# Patient Record
Sex: MALE | Race: WHITE | URBAN - METROPOLITAN AREA
[De-identification: names, ages, dates, MRNs, and addresses within clinical notes are randomized per-mention and may not be internally consistent; named-entity substitution may affect disease eponyms.]

---

## 2018-01-11 ENCOUNTER — DOCTOR'S OFFICE (OUTPATIENT)
Dept: URBAN - METROPOLITAN AREA CLINIC 137 | Facility: CLINIC | Age: 73
Setting detail: OPHTHALMOLOGY
End: 2018-01-11
Payer: COMMERCIAL

## 2018-01-11 DIAGNOSIS — H52.4: ICD-10-CM

## 2018-01-11 PROBLEM — H25.13 CATARACT NUCLEAR SCLEROSIS; BOTH EYES: Status: ACTIVE | Noted: 2018-01-11

## 2018-01-11 PROCEDURE — 92004 COMPRE OPH EXAM NEW PT 1/>: CPT | Performed by: OPHTHALMOLOGY

## 2018-01-11 ASSESSMENT — REFRACTION_OUTSIDERX
OD_AXIS: 105
OS_VA3: 20/
OD_VA1: 20/30
OS_AXIS: 005
OD_CYLINDER: +0.25
OS_CYLINDER: +0.75
OS_VA2: 20/20
OS_SPHERE: -3.50
OD_SPHERE: -4.00
OD_VA2: 20/20
OD_VA3: 20/
OU_VA: 20/
OS_ADD: +2.50
OD_ADD: +2.50
OS_VA1: 20/20

## 2018-01-11 ASSESSMENT — REFRACTION_CURRENTRX
OD_CYLINDER: +0.75
OS_OVR_VA: 20/
OS_SPHERE: -3.25
OS_OVR_VA: 20/
OD_ADD: +2.50
OS_AXIS: 005
OD_OVR_VA: 20/
OD_AXIS: 105
OS_ADD: +2.50
OD_VPRISM_DIRECTION: PROGS
OD_OVR_VA: 20/
OS_CYLINDER: +0.50
OS_OVR_VA: 20/
OD_OVR_VA: 20/
OD_SPHERE: -4.00

## 2018-01-11 ASSESSMENT — REFRACTION_MANIFEST
OD_VA1: 20/
OD_VA1: 20/
OU_VA: 20/
OS_VA1: 20/
OD_VA3: 20/
OD_VA2: 20/
OS_VA1: 20/
OS_VA3: 20/
OD_VA2: 20/
OS_VA2: 20/
OS_VA2: 20/
OD_VA3: 20/
OS_VA3: 20/
OU_VA: 20/

## 2018-01-11 ASSESSMENT — REFRACTION_AUTOREFRACTION
OS_CYLINDER: +1.25
OD_CYLINDER: +1.25
OD_AXIS: 169
OD_SPHERE: -4.00
OS_SPHERE: -3.50
OS_AXIS: 11

## 2018-01-11 ASSESSMENT — CONFRONTATIONAL VISUAL FIELD TEST (CVF)
OS_FINDINGS: FULL
OD_FINDINGS: FULL

## 2018-01-11 ASSESSMENT — VISUAL ACUITY
OD_BCVA: 20/20-2
OS_BCVA: 20/25-1

## 2018-01-11 ASSESSMENT — SPHEQUIV_DERIVED
OD_SPHEQUIV: -3.375
OS_SPHEQUIV: -2.875

## 2021-01-20 DIAGNOSIS — Z23 ENCOUNTER FOR IMMUNIZATION: ICD-10-CM

## 2021-01-21 ENCOUNTER — IMMUNIZATIONS (OUTPATIENT)
Dept: FAMILY MEDICINE CLINIC | Facility: HOSPITAL | Age: 76
End: 2021-01-21

## 2021-01-21 DIAGNOSIS — Z23 ENCOUNTER FOR IMMUNIZATION: Primary | ICD-10-CM

## 2021-01-21 PROCEDURE — 0011A SARS-COV-2 / COVID-19 MRNA VACCINE (MODERNA) 100 MCG: CPT | Performed by: INTERNAL MEDICINE

## 2021-01-21 PROCEDURE — 91301 SARS-COV-2 / COVID-19 MRNA VACCINE (MODERNA) 100 MCG: CPT | Performed by: INTERNAL MEDICINE

## 2021-02-17 ENCOUNTER — IMMUNIZATIONS (OUTPATIENT)
Dept: FAMILY MEDICINE CLINIC | Facility: HOSPITAL | Age: 76
End: 2021-02-17

## 2021-02-17 DIAGNOSIS — Z23 ENCOUNTER FOR IMMUNIZATION: Primary | ICD-10-CM

## 2021-02-17 PROCEDURE — 0012A SARS-COV-2 / COVID-19 MRNA VACCINE (MODERNA) 100 MCG: CPT | Performed by: EMERGENCY MEDICINE

## 2021-02-17 PROCEDURE — 91301 SARS-COV-2 / COVID-19 MRNA VACCINE (MODERNA) 100 MCG: CPT | Performed by: EMERGENCY MEDICINE

## 2021-05-06 ENCOUNTER — APPOINTMENT (OUTPATIENT)
Dept: RADIOLOGY | Facility: AMBULARY SURGERY CENTER | Age: 76
End: 2021-05-06
Attending: ORTHOPAEDIC SURGERY
Payer: COMMERCIAL

## 2021-05-06 DIAGNOSIS — M79.671 PAIN IN RIGHT FOOT: ICD-10-CM

## 2021-05-06 PROCEDURE — 73630 X-RAY EXAM OF FOOT: CPT

## 2021-05-13 ENCOUNTER — HOSPITAL ENCOUNTER (OUTPATIENT)
Dept: MRI IMAGING | Facility: HOSPITAL | Age: 76
Discharge: HOME | End: 2021-05-13
Attending: ORTHOPAEDIC SURGERY
Payer: COMMERCIAL

## 2021-05-13 DIAGNOSIS — M89.9 LYTIC LESION OF BONE ON X-RAY: ICD-10-CM

## 2021-05-13 DIAGNOSIS — M79.671 PAIN IN RIGHT FOOT: ICD-10-CM

## 2021-05-13 PROCEDURE — 73718 MRI LOWER EXTREMITY W/O DYE: CPT

## 2021-05-13 PROCEDURE — G1004 CDSM NDSC: HCPCS

## 2021-05-20 VITALS
HEIGHT: 69 IN | WEIGHT: 192.9 LBS | DIASTOLIC BLOOD PRESSURE: 83 MMHG | SYSTOLIC BLOOD PRESSURE: 152 MMHG | HEART RATE: 69 BPM | BODY MASS INDEX: 28.57 KG/M2

## 2021-05-20 DIAGNOSIS — G57.61 MORTON'S NEUROMA OF THIRD INTERSPACE OF RIGHT FOOT: Primary | ICD-10-CM

## 2021-05-20 DIAGNOSIS — M77.51 BURSITIS OF INTERMETATARSAL BURSA OF RIGHT FOOT: ICD-10-CM

## 2021-05-20 PROCEDURE — 99213 OFFICE O/P EST LOW 20 MIN: CPT | Performed by: ORTHOPAEDIC SURGERY

## 2021-05-20 PROCEDURE — 1160F RVW MEDS BY RX/DR IN RCRD: CPT | Performed by: ORTHOPAEDIC SURGERY

## 2021-05-20 NOTE — PROGRESS NOTES
Assessment:       1  Eng's neuroma of third interspace of right foot    2  Bursitis of intermetatarsal bursa of right foot          Plan:          I explained my current clinical findings and reviewed right foot MRI findings with Mr Minh Mejia  His right foot discomfort is likely secondary to 3rd intermetatarsal space bursitis and associated small Eng's neuroma  He is already having some symptomatic improvement with wearing a metatarsal pad  I explained to him the possibility of intermetatarsal bursa and Eng's neuroma cortisone injection  He would currently like to continue with a more conservative approach with wearing a metatarsal pad  He will follow up in about 2 months time for reassessment in this regard and if symptoms significantly persist we will consider doing intermetatarsal cortisone injection  Subjective:     Patient ID: Roosevelt Colunga is a 68 y o  male  Chief Complaint: Right  Foot pain follow-up    HPI  Mr Minh Mejia  Is a 51-year-old gentleman who presents today for a follow-up of right foot pain  Was last seen on 5/6/2021For right foot pain present for nearly 3 months duration  Plain radiograph had also revealed a lytic lesion of the 5th metatarsal head for which an MRI was done for further assessment  Right foot MRI revealed a small 3rd intermetatarsal space Eng's neuroma along with a small 3rd intermetatarsal space bursitis  The 5th metatarsal head lytic lesion was thought to be a chronic bony erosion possibly a sequelae of previous gouty arthritis without any evidence of acute inflammation  Today, the patient reports that his right foot pain is improving after wearing metatarsal pads in his footwear  Denies any significant tingling or numbness of the right foot toes  Denies any new injury       Social History     Occupational History    Not on file   Tobacco Use    Smoking status: Never Smoker    Smokeless tobacco: Never Used   Substance and Sexual Activity  Alcohol use: Never     Frequency: Never    Drug use: Never    Sexual activity: Not on file      Review of Systems   Constitutional: Negative  HENT: Negative  Eyes: Negative  Respiratory: Negative  Cardiovascular: Negative  Gastrointestinal: Negative  Endocrine: Negative  Genitourinary: Negative  Skin: Negative  Allergic/Immunologic: Negative  Neurological: Negative  Psychiatric/Behavioral: Negative  Objective:     Ortho ExamPhysical Exam  Vitals signs and nursing note reviewed  Constitutional:       Appearance: He is well-developed  HENT:      Head: Normocephalic and atraumatic  Eyes:      Conjunctiva/sclera: Conjunctivae normal    Cardiovascular:      Rate and Rhythm: Normal rate and regular rhythm  Pulmonary:      Effort: Pulmonary effort is normal  No respiratory distress  Skin:     General: Skin is warm  Findings: No erythema  Neurological:      Mental Status: He is alert and oriented to person, place, and time  Psychiatric:         Behavior: Behavior normal          Thought Content: Thought content normal          Judgment: Judgment normal            Right foot exam:   pes planus is noted  There is some mild tenderness to palpation in the 3rd intermetatarsal space but this is comparatively less than the last clinical exam done 2 weeks ago  There is only minimal discomfort with metatarsal squeeze  There is no midfoot or hindfoot tenderness noted  Normal sensation to light touch in all digits of the right foot  I have personally reviewed pertinent films in PACS and my interpretation is As noted in the HPI

## 2021-06-16 ENCOUNTER — OFFICE VISIT (OUTPATIENT)
Dept: PODIATRY | Facility: CLINIC | Age: 76
End: 2021-06-16
Payer: COMMERCIAL

## 2021-06-16 VITALS
BODY MASS INDEX: 28.58 KG/M2 | DIASTOLIC BLOOD PRESSURE: 79 MMHG | HEART RATE: 78 BPM | SYSTOLIC BLOOD PRESSURE: 159 MMHG | HEIGHT: 69 IN | WEIGHT: 193 LBS

## 2021-06-16 DIAGNOSIS — G57.61 LESION OF RIGHT PLANTAR NERVE: Primary | ICD-10-CM

## 2021-06-16 PROCEDURE — 1036F TOBACCO NON-USER: CPT | Performed by: PODIATRIST

## 2021-06-16 PROCEDURE — 1160F RVW MEDS BY RX/DR IN RCRD: CPT | Performed by: PODIATRIST

## 2021-06-16 PROCEDURE — 99202 OFFICE O/P NEW SF 15 MIN: CPT | Performed by: PODIATRIST

## 2021-06-16 NOTE — PROGRESS NOTES
Assessment/Plan:      I personally reviewed x-rays dated 05/06/2021  They were negative 1st significant osseous pathology  I personally reviewed MRI report dated 05/13/2021  They were positive for a small Eng's neuroma  Reviewed treatment options for Eng's neuroma with patient  Explained to patient that he has mild symptomatology at this time with numbness the main issue  This will likely persist   Cortisone injection is not needed at this time due to paucity of symptoms  Patient advised to wear wide shoes and insoles as much as possible  He was advised to contact me should pain recur or persist     No problem-specific Assessment & Plan notes found for this encounter  Diagnoses and all orders for this visit:    Lesion of right plantar nerve          Subjective:      Patient ID: Roosevelt Colunga is a 68 y o  male  HPI       Patient, a 68-year-old male in good health presents with concern regarding his right foot  For the past few months, patient has been having pain from a diagnosed Eng's neuroma  He has seen Dr Leopold Care  For this disorder and has had an x-ray and an MRI  The x-rays were read as negative for significant pathology but the MRI was positive for Eng's neuroma at the 3rd metatarsal interspace  Patient notes that he has minimal if any pain at this time although he does have numbness in his 3rd and 4th toes  He has purchased wide running shoes and is wearing an inner sole in the shoe  The following portions of the patient's history were reviewed and updated as appropriate: allergies, current medications, past family history, past medical history, past social history, past surgical history and problem list     Review of Systems   Cardiovascular: Negative  Gastrointestinal: Negative  Musculoskeletal: Negative  Neurological: Negative                Objective:      /79   Pulse 78   Ht 5' 8 5" (1 74 m)   Wt 87 5 kg (193 lb)   BMI 28 92 kg/m² Physical Exam  Constitutional:       Appearance: Normal appearance  Cardiovascular:      Pulses: Normal pulses  Musculoskeletal:         General: Deformity present  Comments: Pes planus bilateral   Skin:     General: Skin is warm  Neurological:      General: No focal deficit present  Mental Status: He is oriented to person, place, and time  Comments: Dejuan Varela sign negative 3rd metatarsal interspace right foot

## 2023-03-15 ENCOUNTER — HOSPITAL ENCOUNTER (OUTPATIENT)
Dept: NON INVASIVE DIAGNOSTICS | Facility: HOSPITAL | Age: 78
Discharge: HOME | End: 2023-03-15
Attending: INTERNAL MEDICINE

## 2023-03-15 VITALS
WEIGHT: 190 LBS | HEIGHT: 68 IN | HEART RATE: 78 BPM | BODY MASS INDEX: 28.79 KG/M2 | SYSTOLIC BLOOD PRESSURE: 159 MMHG | DIASTOLIC BLOOD PRESSURE: 79 MMHG

## 2023-03-15 DIAGNOSIS — I35.9 AORTIC VALVE DISEASE: ICD-10-CM

## 2023-03-15 LAB
AORTIC ROOT: 2.9 CM
AORTIC VALVE MEAN VELOCITY: 13.2 M/S
APICAL FOUR CHAMBER EJECTION FRACTION: 59 %
ASCENDING AORTA: 4 CM
AV AREA BY CONTINUOUS VTI: 2.1 CM2
AV AREA PEAK VELOCITY: 2.3 CM2
AV LVOT MEAN GRADIENT: 2 MMHG
AV LVOT PEAK GRADIENT: 4 MMHG
AV MEAN GRADIENT: 8 MMHG
AV PEAK GRADIENT: 14 MMHG
AV VALVE AREA: 2.12 CM2
AV VELOCITY RATIO: 0.5
DOP CALC AO PEAK VEL: 1.89 M/S
DOP CALC AO VTI: 42.39 CM
DOP CALC LVOT AREA: 4.52 CM2
DOP CALC LVOT DIAMETER: 2.4 CM
DOP CALC LVOT PEAK VEL VTI: 19.88 CM
DOP CALC LVOT PEAK VEL: 0.95 M/S
DOP CALC LVOT STROKE INDEX: 43.8 ML/M2
DOP CALC LVOT STROKE VOLUME: 89.89 CM3
E WAVE DECELERATION TIME: 256 MS
FRACTIONAL SHORTENING: 36 % (ref 28–44)
INTERVENTRICULAR SEPTUM IN DIASTOLE (PARASTERNAL SHORT AXIS VIEW): 1.3 CM
INTERVENTRICULAR SEPTUM: 1.3 CM (ref 0.6–1.1)
LAAS-AP2: 20.7 CM2
LAAS-AP4: 22.5 CM2
LEFT ATRIUM SIZE: 3.7 CM
LEFT INTERNAL DIMENSION IN SYSTOLE: 2.7 CM (ref 2.1–4)
LEFT VENTRICULAR INTERNAL DIMENSION IN DIASTOLE: 4.2 CM (ref 3.5–6)
LEFT VENTRICULAR POSTERIOR WALL IN END DIASTOLE: 1.2 CM
LEFT VENTRICULAR STROKE VOLUME: 51 ML
LVSV (TEICH): 51 ML
MV E'TISSUE VEL-SEP: 9 CM/S
MV PEAK A VEL: 0.68 M/S
MV PEAK E VEL: 64 CM/S
MV STENOSIS PRESSURE HALF TIME: 74 MS
MV VALVE AREA P 1/2 METHOD: 2.97 CM2
RA PRESSURE ESTIMATED: 4 MMHG
RIGHT ATRIAL 2D VOLUME: 58 ML
RIGHT ATRIUM AREA SYSTOLE A4C: 21.7 CM2
RIGHT VENTRICLE ID DIMENSION: 4.4 CM
RV PSP: 25 MMHG
SINOTUBULAR JUNCTION: 2.9 CM
SL CV LEFT ATRIUM LENGTH A2C: 5.8 CM
SL CV LV EF: 60
SL CV PED ECHO LEFT VENTRICLE DIASTOLIC VOLUME (MOD BIPLANE) 2D: 78 ML
SL CV PED ECHO LEFT VENTRICLE SYSTOLIC VOLUME (MOD BIPLANE) 2D: 27 ML
STJ: 2.9 CM
TR MAX PG: 21 MMHG
TR PEAK VELOCITY: 2.3 M/S
TRICUSPID ANNULAR PLANE SYSTOLIC EXCURSION: 2.2 CM
TRICUSPID VALVE PEAK REGURGITATION VELOCITY: 2.31 M/S

## 2023-08-24 ENCOUNTER — HOSPITAL ENCOUNTER (OUTPATIENT)
Dept: CT IMAGING | Facility: HOSPITAL | Age: 78
End: 2023-08-24
Attending: INTERNAL MEDICINE
Payer: COMMERCIAL

## 2023-08-24 DIAGNOSIS — I77.810 THORACIC AORTIC ECTASIA (HCC): ICD-10-CM

## 2023-08-24 PROCEDURE — 71250 CT THORAX DX C-: CPT

## 2023-11-01 ENCOUNTER — HOSPITAL ENCOUNTER (OUTPATIENT)
Dept: ULTRASOUND IMAGING | Facility: HOSPITAL | Age: 78
Discharge: HOME | End: 2023-11-01
Attending: UROLOGY
Payer: COMMERCIAL

## 2023-11-01 DIAGNOSIS — N21.1 CALCULUS IN URETHRA: ICD-10-CM

## 2023-11-01 DIAGNOSIS — N28.1 SIMPLE RENAL CYST: ICD-10-CM

## 2023-11-01 PROCEDURE — 76775 US EXAM ABDO BACK WALL LIM: CPT

## 2023-11-02 ENCOUNTER — TELEPHONE (OUTPATIENT)
Dept: GASTROENTEROLOGY | Facility: CLINIC | Age: 78
End: 2023-11-02

## 2023-11-02 ENCOUNTER — OFFICE VISIT (OUTPATIENT)
Dept: GASTROENTEROLOGY | Facility: CLINIC | Age: 78
End: 2023-11-02
Payer: COMMERCIAL

## 2023-11-02 VITALS
BODY MASS INDEX: 30.16 KG/M2 | WEIGHT: 199 LBS | SYSTOLIC BLOOD PRESSURE: 139 MMHG | DIASTOLIC BLOOD PRESSURE: 82 MMHG | HEIGHT: 68 IN

## 2023-11-02 DIAGNOSIS — C85.20: ICD-10-CM

## 2023-11-02 DIAGNOSIS — K21.9 GASTROESOPHAGEAL REFLUX DISEASE, UNSPECIFIED WHETHER ESOPHAGITIS PRESENT: Primary | ICD-10-CM

## 2023-11-02 DIAGNOSIS — Z12.11 COLON CANCER SCREENING: ICD-10-CM

## 2023-11-02 PROCEDURE — 99203 OFFICE O/P NEW LOW 30 MIN: CPT | Performed by: NURSE PRACTITIONER

## 2023-11-02 RX ORDER — PANTOPRAZOLE SODIUM 40 MG/1
40 TABLET, DELAYED RELEASE ORAL DAILY
Qty: 90 TABLET | Refills: 1 | Status: SHIPPED | OUTPATIENT
Start: 2023-11-02 | End: 2024-01-31

## 2023-11-02 NOTE — TELEPHONE ENCOUNTER
Scheduled date of EGD/colonoscopy (as of today):11/16/23  Physician performing EGD/colonoscopy:Dr Jessica Gracia   Location of EGD/colonoscopy:[facility]  Desired bowel prep reviewed with patient:miralax   Instructions reviewed with patient by:sb  Clearances:  Dr Nicole Acosta -procedure clearance

## 2023-11-02 NOTE — PROGRESS NOTES
[facility] - Outpatient Consultation  Marguerite Davis 66 y.o. 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 Gastroesophageal reflux disease, unspecified whether esophagitis present  Patient reports a chronic cough on a daily basis. Patient also reports acid reflux. Patient was previously on pantoprazole by his PCP which did help to improve his symptoms but not resolve his symptoms. Patient denies nausea, vomiting, heartburn, epigastric or abdominal pain. CT chest done 8/24 showed nonspecific mildly enlarged lymph nodes adjacent to the distal esophagus more pronounced compared to April 2023 CT of abdomen and pelvis. Partially imaged diverticulum in the Horizon portion of the duodenum.    -Ambulatory Referral to Gastroenterology  - pantoprazole (PROTONIX) 40 mg tablet; Take 1 tablet (40 mg total) by mouth daily Take half hour prior to breakfast  Dispense: 90 tablet; Refill: 1  - EGD; schedule for EGD. Prep and procedure explained to patient in detail. Further recommendations pending results of EGD. 3. Colon cancer screening  Patient is age 66 patient has not had any colon cancer recently remaining for a long period of time. Last colon cancer screening done in [place] report is not available. - Colonoscopy; schedule for colonoscopy. Prep and procedure explained to patient in detail. Further recommendations pending results of colonoscopy. Follow up 8 weeks after procedure    ______________________________________________________________________    HPI: Maritza Finn is a 40-year-old male with past medical history of mediastinal large B-cell lymphoma, hyperlipidemia, and heart murmur who presents to office as consult. Patient reports a chronic cough on a daily basis. Patient also reports acid reflux.   Patient was previously on pantoprazole by his PCP which did help to improve his symptoms but not resolve his symptoms. Patient denies nausea, vomiting, heartburn, epigastric or abdominal pain. Patient denies blood in stool, blood from rectal area, or black tarry stool. Bowel patterns are regular. Abdomen exam benign, no abdominal tenderness or guarding. Patient does not smoke. CT chest done 8/24 showed nonspecific mildly enlarged lymph nodes adjacent to the distal esophagus more pronounced compared to April 2023 CT of abdomen and pelvis. Partially imaged diverticulum in the Horizon portion of the duodenum. No family history of gastric or colon cancer. Last colonoscopy done many years ago in [place] report is not available. REVIEW OF SYSTEMS:    CONSTITUTIONAL: Denies any fever, chills, rigors, and weight loss. HEENT: No earache or tinnitus. Denies hearing loss or visual disturbances. CARDIOVASCULAR: No chest pain or palpitations. RESPIRATORY: Denies any cough, hemoptysis, shortness of breath or dyspnea on exertion. GASTROINTESTINAL: As noted in the History of Present Illness. GENITOURINARY: No problems with urination. Denies any hematuria or dysuria. NEUROLOGIC: No dizziness or vertigo, denies headaches. MUSCULOSKELETAL: Denies any muscle or joint pain. SKIN: Denies skin rashes or itching. ENDOCRINE: Denies excessive thirst. Denies intolerance to heat or cold. PSYCHOSOCIAL: Denies depression or anxiety. Denies any recent memory loss. Historical Information   Past Medical History:   Diagnosis Date    Ear problems     Fallen arches 5/1/21    Hyperlipidemia      Past Surgical History:   Procedure Laterality Date    TONSILLECTOMY       Social History   Social History     Substance and Sexual Activity   Alcohol Use Not Currently    Alcohol/week: 0.0 standard drinks of alcohol     Social History     Substance and Sexual Activity   Drug Use Never     Social History     Tobacco Use   Smoking Status Never   Smokeless Tobacco Never     History reviewed.  No pertinent family history. Meds/Allergies       Current Outpatient Medications:     atorvastatin (LIPITOR) 20 mg tablet    metoprolol succinate (TOPROL-XL) 6.25 mg 24 hr tablet    pantoprazole (PROTONIX) 40 mg tablet    No Known Allergies        Objective     Blood pressure 139/82, height 5' 8" (1.727 m), weight 90.3 kg (199 lb). Body mass index is 30.26 kg/m². PHYSICAL EXAM:      General Appearance:   Alert, cooperative, no distress   HEENT:   Normocephalic, atraumatic, anicteric. Neck:  Supple, symmetrical, trachea midline   Lungs:   Clear to auscultation bilaterally; no rales, rhonchi or wheezing; respirations unlabored    Heart[de-identified]   Regular rate and rhythm; no murmur, rub, or gallop. Abdomen:   Soft, non-tender, non-distended; normal bowel sounds; no masses, no organomegaly    Genitalia:   Deferred    Rectal:   Deferred    Extremities:  No cyanosis, clubbing or edema    Pulses:  2+ and symmetric    Skin:  No jaundice, rashes, or lesions    Lymph nodes:  No palpable cervical lymphadenopathy        Lab Results:   No visits with results within 1 Day(s) from this visit.    Latest known visit with results is:   Appointment on 04/10/2023   Component Date Value    WBC 04/10/2023 9.85     RBC 04/10/2023 5.37     Hemoglobin 04/10/2023 15.9     Hematocrit 04/10/2023 47.7     MCV 04/10/2023 89     MCH 04/10/2023 29.6     MCHC 04/10/2023 33.3     RDW 04/10/2023 13.5     MPV 04/10/2023 9.5     Platelets 77/02/0695 222     nRBC 04/10/2023 0     Neutrophils Relative 04/10/2023 66     Immat GRANS % 04/10/2023 1     Lymphocytes Relative 04/10/2023 21     Monocytes Relative 04/10/2023 8     Eosinophils Relative 04/10/2023 3     Basophils Relative 04/10/2023 1     Neutrophils Absolute 04/10/2023 6.62     Immature Grans Absolute 04/10/2023 0.05     Lymphocytes Absolute 04/10/2023 2.10     Monocytes Absolute 04/10/2023 0.77     Eosinophils Absolute 04/10/2023 0.26     Basophils Absolute 04/10/2023 0.05     Sodium 04/10/2023 137 Potassium 04/10/2023 4.0     Chloride 04/10/2023 100     CO2 04/10/2023 28     ANION GAP 04/10/2023 9     BUN 04/10/2023 20     Creatinine 04/10/2023 1.07     Glucose 04/10/2023 85     Calcium 04/10/2023 9.7     AST 04/10/2023 16     ALT 04/10/2023 23     Alkaline Phosphatase 04/10/2023 116 (H)     Total Protein 04/10/2023 7.5     Albumin 04/10/2023 4.3     Total Bilirubin 04/10/2023 0.49     eGFR 04/10/2023 66     Urine Culture 04/10/2023 No Growth <1000 cfu/mL          Radiology Results:   No results found.

## 2023-11-02 NOTE — TELEPHONE ENCOUNTER
Can this pt go to [address] [place] [address]? Cardiac clearance sent to Dr Farhana San [facility].  Sb         ASC Screening    ASC Screening  BMI > than 45: No  Are you currently pregnant?: No  Do you rely on a wheelchair for mobility?: No  Do you need oxygen during the day?: No  Have you ever been informed by anesthesia that you have a difficult airway?: No  Have you been diagnosed with End Stage Renal Disease (ESRD)?: No  Are you actively on dialysis?: No  Have you been diagnosed with Pulmonary Hypertension?: No  Do you have a pacemaker or an Automatic Implantable Cardioverter Defibrillator (AICD)?: No  Have you ever had an organ transplant?: No  Have you had a stroke, heart attack, myocardial infarction (MI) within the last 6 months?: No  Have you ever been diagnosed with Aortic Stenosis?: No  Have you ever been diagnosed  with Congestive Heart Failure?: No  Have you ever been diagnosed with a heart valve disease?: Yes  Are you Diabetic?: No  If you are Diabetic, has your A1C been greater than 12 within the last six months?: N/A

## 2023-11-02 NOTE — H&P (VIEW-ONLY)
[facility] - Outpatient Consultation  Jeremiah Davis 66 y.o. 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 Gastroesophageal reflux disease, unspecified whether esophagitis present  Patient reports a chronic cough on a daily basis. Patient also reports acid reflux. Patient was previously on pantoprazole by his PCP which did help to improve his symptoms but not resolve his symptoms. Patient denies nausea, vomiting, heartburn, epigastric or abdominal pain. CT chest done 8/24 showed nonspecific mildly enlarged lymph nodes adjacent to the distal esophagus more pronounced compared to April 2023 CT of abdomen and pelvis. Partially imaged diverticulum in the Horizon portion of the duodenum.    -Ambulatory Referral to Gastroenterology  - pantoprazole (PROTONIX) 40 mg tablet; Take 1 tablet (40 mg total) by mouth daily Take half hour prior to breakfast  Dispense: 90 tablet; Refill: 1  - EGD; schedule for EGD. Prep and procedure explained to patient in detail. Further recommendations pending results of EGD. 3. Colon cancer screening  Patient is age 66 patient has not had any colon cancer recently remaining for a long period of time. Last colon cancer screening done in [place] report is not available. - Colonoscopy; schedule for colonoscopy. Prep and procedure explained to patient in detail. Further recommendations pending results of colonoscopy. Follow up 8 weeks after procedure    ______________________________________________________________________    HPI: Shala Laird is a 77-year-old male with past medical history of mediastinal large B-cell lymphoma, hyperlipidemia, and heart murmur who presents to office as consult. Patient reports a chronic cough on a daily basis. Patient also reports acid reflux.   Patient was previously on pantoprazole by his PCP which did help to improve his symptoms but not resolve his symptoms. Patient denies nausea, vomiting, heartburn, epigastric or abdominal pain. Patient denies blood in stool, blood from rectal area, or black tarry stool. Bowel patterns are regular. Abdomen exam benign, no abdominal tenderness or guarding. Patient does not smoke. CT chest done 8/24 showed nonspecific mildly enlarged lymph nodes adjacent to the distal esophagus more pronounced compared to April 2023 CT of abdomen and pelvis. Partially imaged diverticulum in the Horizon portion of the duodenum. No family history of gastric or colon cancer. Last colonoscopy done many years ago in [place] report is not available. REVIEW OF SYSTEMS:    CONSTITUTIONAL: Denies any fever, chills, rigors, and weight loss. HEENT: No earache or tinnitus. Denies hearing loss or visual disturbances. CARDIOVASCULAR: No chest pain or palpitations. RESPIRATORY: Denies any cough, hemoptysis, shortness of breath or dyspnea on exertion. GASTROINTESTINAL: As noted in the History of Present Illness. GENITOURINARY: No problems with urination. Denies any hematuria or dysuria. NEUROLOGIC: No dizziness or vertigo, denies headaches. MUSCULOSKELETAL: Denies any muscle or joint pain. SKIN: Denies skin rashes or itching. ENDOCRINE: Denies excessive thirst. Denies intolerance to heat or cold. PSYCHOSOCIAL: Denies depression or anxiety. Denies any recent memory loss. Historical Information   Past Medical History:   Diagnosis Date    Ear problems     Fallen arches 5/1/21    Hyperlipidemia      Past Surgical History:   Procedure Laterality Date    TONSILLECTOMY       Social History   Social History     Substance and Sexual Activity   Alcohol Use Not Currently    Alcohol/week: 0.0 standard drinks of alcohol     Social History     Substance and Sexual Activity   Drug Use Never     Social History     Tobacco Use   Smoking Status Never   Smokeless Tobacco Never     History reviewed.  No pertinent family history. Meds/Allergies       Current Outpatient Medications:     atorvastatin (LIPITOR) 20 mg tablet    metoprolol succinate (TOPROL-XL) 6.25 mg 24 hr tablet    pantoprazole (PROTONIX) 40 mg tablet    No Known Allergies        Objective     Blood pressure 139/82, height 5' 8" (1.727 m), weight 90.3 kg (199 lb). Body mass index is 30.26 kg/m². PHYSICAL EXAM:      General Appearance:   Alert, cooperative, no distress   HEENT:   Normocephalic, atraumatic, anicteric. Neck:  Supple, symmetrical, trachea midline   Lungs:   Clear to auscultation bilaterally; no rales, rhonchi or wheezing; respirations unlabored    Heart[de-identified]   Regular rate and rhythm; no murmur, rub, or gallop. Abdomen:   Soft, non-tender, non-distended; normal bowel sounds; no masses, no organomegaly    Genitalia:   Deferred    Rectal:   Deferred    Extremities:  No cyanosis, clubbing or edema    Pulses:  2+ and symmetric    Skin:  No jaundice, rashes, or lesions    Lymph nodes:  No palpable cervical lymphadenopathy        Lab Results:   No visits with results within 1 Day(s) from this visit.    Latest known visit with results is:   Appointment on 04/10/2023   Component Date Value    WBC 04/10/2023 9.85     RBC 04/10/2023 5.37     Hemoglobin 04/10/2023 15.9     Hematocrit 04/10/2023 47.7     MCV 04/10/2023 89     MCH 04/10/2023 29.6     MCHC 04/10/2023 33.3     RDW 04/10/2023 13.5     MPV 04/10/2023 9.5     Platelets 40/62/1232 222     nRBC 04/10/2023 0     Neutrophils Relative 04/10/2023 66     Immat GRANS % 04/10/2023 1     Lymphocytes Relative 04/10/2023 21     Monocytes Relative 04/10/2023 8     Eosinophils Relative 04/10/2023 3     Basophils Relative 04/10/2023 1     Neutrophils Absolute 04/10/2023 6.62     Immature Grans Absolute 04/10/2023 0.05     Lymphocytes Absolute 04/10/2023 2.10     Monocytes Absolute 04/10/2023 0.77     Eosinophils Absolute 04/10/2023 0.26     Basophils Absolute 04/10/2023 0.05     Sodium 04/10/2023 137 Potassium 04/10/2023 4.0     Chloride 04/10/2023 100     CO2 04/10/2023 28     ANION GAP 04/10/2023 9     BUN 04/10/2023 20     Creatinine 04/10/2023 1.07     Glucose 04/10/2023 85     Calcium 04/10/2023 9.7     AST 04/10/2023 16     ALT 04/10/2023 23     Alkaline Phosphatase 04/10/2023 116 (H)     Total Protein 04/10/2023 7.5     Albumin 04/10/2023 4.3     Total Bilirubin 04/10/2023 0.49     eGFR 04/10/2023 66     Urine Culture 04/10/2023 No Growth <1000 cfu/mL          Radiology Results:   No results found.

## 2023-11-09 ENCOUNTER — ANESTHESIA (OUTPATIENT)
Dept: ANESTHESIOLOGY | Facility: AMBULATORY SURGERY CENTER | Age: 78
End: 2023-11-09

## 2023-11-09 ENCOUNTER — ANESTHESIA EVENT (OUTPATIENT)
Dept: ANESTHESIOLOGY | Facility: AMBULATORY SURGERY CENTER | Age: 78
End: 2023-11-09

## 2023-11-16 ENCOUNTER — ANESTHESIA EVENT (OUTPATIENT)
Dept: GASTROENTEROLOGY | Facility: AMBULATORY SURGERY CENTER | Age: 78
End: 2023-11-16

## 2023-11-16 ENCOUNTER — ANESTHESIA (OUTPATIENT)
Dept: GASTROENTEROLOGY | Facility: AMBULATORY SURGERY CENTER | Age: 78
End: 2023-11-16

## 2023-11-16 ENCOUNTER — HOSPITAL ENCOUNTER (OUTPATIENT)
Dept: GASTROENTEROLOGY | Facility: AMBULATORY SURGERY CENTER | Age: 78
Discharge: HOME | End: 2023-11-16
Payer: COMMERCIAL

## 2023-11-16 VITALS
WEIGHT: 195 LBS | BODY MASS INDEX: 27.92 KG/M2 | HEIGHT: 70 IN | DIASTOLIC BLOOD PRESSURE: 80 MMHG | OXYGEN SATURATION: 96 % | HEART RATE: 67 BPM | TEMPERATURE: 97.3 F | SYSTOLIC BLOOD PRESSURE: 143 MMHG | RESPIRATION RATE: 18 BRPM

## 2023-11-16 DIAGNOSIS — Z12.11 COLON CANCER SCREENING: ICD-10-CM

## 2023-11-16 DIAGNOSIS — K21.9 GASTROESOPHAGEAL REFLUX DISEASE, UNSPECIFIED WHETHER ESOPHAGITIS PRESENT: ICD-10-CM

## 2023-11-16 PROCEDURE — 88305 TISSUE EXAM BY PATHOLOGIST: CPT | Performed by: PATHOLOGY

## 2023-11-16 PROCEDURE — 43239 EGD BIOPSY SINGLE/MULTIPLE: CPT | Performed by: INTERNAL MEDICINE

## 2023-11-16 PROCEDURE — G0121 COLON CA SCRN NOT HI RSK IND: HCPCS | Performed by: INTERNAL MEDICINE

## 2023-11-16 RX ORDER — SODIUM CHLORIDE, SODIUM LACTATE, POTASSIUM CHLORIDE, CALCIUM CHLORIDE 600; 310; 30; 20 MG/100ML; MG/100ML; MG/100ML; MG/100ML
INJECTION, SOLUTION INTRAVENOUS CONTINUOUS PRN
Status: DISCONTINUED | OUTPATIENT
Start: 2023-11-16 | End: 2023-11-16

## 2023-11-16 RX ORDER — SODIUM CHLORIDE, SODIUM LACTATE, POTASSIUM CHLORIDE, CALCIUM CHLORIDE 600; 310; 30; 20 MG/100ML; MG/100ML; MG/100ML; MG/100ML
125 INJECTION, SOLUTION INTRAVENOUS CONTINUOUS
Status: DISCONTINUED | OUTPATIENT
Start: 2023-11-16 | End: 2023-11-20 | Stop reason: HOSPADM

## 2023-11-16 RX ORDER — SODIUM CHLORIDE, SODIUM LACTATE, POTASSIUM CHLORIDE, CALCIUM CHLORIDE 600; 310; 30; 20 MG/100ML; MG/100ML; MG/100ML; MG/100ML
125 INJECTION, SOLUTION INTRAVENOUS CONTINUOUS
Status: CANCELLED | OUTPATIENT
Start: 2023-11-16

## 2023-11-16 RX ORDER — LIDOCAINE HYDROCHLORIDE 10 MG/ML
INJECTION, SOLUTION EPIDURAL; INFILTRATION; INTRACAUDAL; PERINEURAL AS NEEDED
Status: DISCONTINUED | OUTPATIENT
Start: 2023-11-16 | End: 2023-11-16

## 2023-11-16 RX ORDER — PROPOFOL 10 MG/ML
INJECTION, EMULSION INTRAVENOUS AS NEEDED
Status: DISCONTINUED | OUTPATIENT
Start: 2023-11-16 | End: 2023-11-16

## 2023-11-16 RX ADMIN — LIDOCAINE HYDROCHLORIDE 50 MG: 10 INJECTION, SOLUTION EPIDURAL; INFILTRATION; INTRACAUDAL; PERINEURAL at 10:43

## 2023-11-16 RX ADMIN — SODIUM CHLORIDE, SODIUM LACTATE, POTASSIUM CHLORIDE, CALCIUM CHLORIDE: 600; 310; 30; 20 INJECTION, SOLUTION INTRAVENOUS at 10:41

## 2023-11-16 RX ADMIN — PROPOFOL 100 MG: 10 INJECTION, EMULSION INTRAVENOUS at 10:43

## 2023-11-16 RX ADMIN — PROPOFOL 50 MG: 10 INJECTION, EMULSION INTRAVENOUS at 10:46

## 2023-11-16 RX ADMIN — PROPOFOL 50 MG: 10 INJECTION, EMULSION INTRAVENOUS at 10:50

## 2023-11-16 RX ADMIN — PROPOFOL 50 MG: 10 INJECTION, EMULSION INTRAVENOUS at 10:44

## 2023-11-16 NOTE — ANESTHESIA PREPROCEDURE EVALUATION
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 Wall thickness is normal. The left ventricular ejection fraction is 60%. Systolic function is normal. Wall motion is normal. Diastolic function is normal for age. Left Atrium: The atrium is mildly dilated. Tricuspid Valve: There is mild regurgitation. Aorta: The aortic root is normal in size. The ascending aorta is mildly dilated at 4cm. Physical Exam    Airway    Mallampati score: III  TM Distance: >3 FB  Neck ROM: full     Dental   No notable dental hx     Cardiovascular      Pulmonary      Other Findings        Anesthesia Plan  ASA Score- 2     Anesthesia Type- IV sedation with anesthesia with ASA Monitors. Additional Monitors:     Airway Plan:            Plan Factors-Exercise tolerance (METS): >4 METS. Chart reviewed. Existing labs reviewed. Patient summary reviewed. Patient is not a current smoker. Induction- intravenous. Postoperative Plan-     Informed Consent- Anesthetic plan and risks discussed with patient. I personally reviewed this patient with the CRNA. Discussed and agreed on the Anesthesia Plan with the CRNA. Mak Avila

## 2023-11-16 NOTE — INTERVAL H&P NOTE
H&P reviewed. After examining the patient I find no changes in the patients condition since the H&P had been written.     Vitals:    11/16/23 1024   BP: 147/74   Pulse: 74   Resp: 18   Temp: (!) 97.3 °F (36.3 °C)   SpO2: 94%

## 2023-11-16 NOTE — ANESTHESIA POSTPROCEDURE EVALUATION
Post-Op Assessment Note    CV Status:  Stable  Pain Score: 0    Pain management: adequate       Mental Status:  Alert and awake   Hydration Status:  Euvolemic   PONV Controlled:  Controlled   Airway Patency:  Patent     Post Op Vitals Reviewed: Yes    No anethesia notable event occurred.     Staff: CRNA               BP   116/71   Temp     Pulse 72   Resp 16   SpO2 97

## 2023-11-22 PROCEDURE — 88305 TISSUE EXAM BY PATHOLOGIST: CPT | Performed by: PATHOLOGY

## 2024-01-01 PROBLEM — Z12.11 COLON CANCER SCREENING: Status: RESOLVED | Noted: 2023-11-02 | Resolved: 2024-01-01

## 2024-01-10 ENCOUNTER — OFFICE VISIT (OUTPATIENT)
Dept: GASTROENTEROLOGY | Facility: CLINIC | Age: 79
End: 2024-01-10
Payer: COMMERCIAL

## 2024-01-10 VITALS
WEIGHT: 193 LBS | SYSTOLIC BLOOD PRESSURE: 126 MMHG | HEART RATE: 97 BPM | HEIGHT: 70 IN | DIASTOLIC BLOOD PRESSURE: 75 MMHG | BODY MASS INDEX: 27.63 KG/M2

## 2024-01-10 DIAGNOSIS — K21.9 GASTROESOPHAGEAL REFLUX DISEASE WITHOUT ESOPHAGITIS: Primary | ICD-10-CM

## 2024-01-10 DIAGNOSIS — K64.8 INTERNAL HEMORRHOIDS: ICD-10-CM

## 2024-01-10 DIAGNOSIS — Z12.11 COLON CANCER SCREENING: ICD-10-CM

## 2024-01-10 PROBLEM — C85.20 MEDIASTINAL (THYMIC) LARGE B-CELL LYMPHOMA (HCC): Status: RESOLVED | Noted: 2023-11-02 | Resolved: 2024-01-10

## 2024-01-10 PROCEDURE — 99214 OFFICE O/P EST MOD 30 MIN: CPT | Performed by: NURSE PRACTITIONER

## 2024-01-10 RX ORDER — VITAMIN B COMPLEX
1 CAPSULE ORAL DAILY
COMMUNITY

## 2024-01-10 NOTE — PROGRESS NOTES
[facility] - Outpatient Consultation  Angelito Estrada 78 y.o. male MRN: [id number]  Encounter: [id number]          ASSESSMENT AND PLAN:      1. Gastroesophageal reflux disease without esophagitis  Patient reports improvement of chronic cough since following antireflux diet and measures and starting pantoprazole daily.  CT chest done 8/24 showed nonspecific mildly enlarged lymph nodes adjacent to the distal esophagus more pronounced compared to April 2023 CT of abdomen and pelvis. Partially imaged diverticulum in the Horizon portion of the duodenum. EGD 11/16/2023 showed esophagus appeared normal.  Duodenum appeared normal.  2 cm hiatal hernia.  Perform forcep biopsy in antrum to rule out H. pylori.  Biopsy benign, no H. pylori.  -Continue pantoprazole 40 Mg daily  -Continue antireflux diet     2. Colon cancer screening  Colonoscopy done 11/16/2023 showed normal colonoscopy except for internal hemorrhoids. -No further screening colonoscopies recommended due to age.      3. Internal hemorrhoids  Colonoscopy showed internal hemorrhoids.  Patient denies any hemorrhoidal discomfort or bleeding from rectal area.    Follow-up in 1 year.    ______________________________________________________________________    HPI: Angelito Estrada is a pleasant 78-year-old male who presents to office for follow-up for chronic cough and post EGD and colonoscopy.  Results of EGD and colonoscopy and biopsy reviewed with patient.  Patient reports her chronic cough has improved since following antireflux diet and measures and starting pantoprazole.  Patient denies acid reflux, heartburn, nausea, vomiting, epigastric or abdominal pain.  Patient denies blood in stool, blood from rectal area, or black tarry stool.  Patient denies any hemorrhoidal discomfort or bleeding.  Bowel patterns are regular.  Abdomen exam benign, no abdominal tenderness or guarding.  Colonoscopy done 11/16/2023 showed normal colonoscopy  except for internal hemorrhoids.   EGD 11/16/2023 showed esophagus appeared normal.  Duodenum appeared normal.  2 cm hiatal hernia.  Perform forcep biopsy in antrum to rule out H. pylori.  Biopsy benign, no H. pylori.        REVIEW OF SYSTEMS:    CONSTITUTIONAL: Denies any fever, chills, rigors, and weight loss.  HEENT: No earache or tinnitus. Denies hearing loss or visual disturbances.  CARDIOVASCULAR: No chest pain or palpitations.   RESPIRATORY: Denies any cough, hemoptysis, shortness of breath or dyspnea on exertion.  GASTROINTESTINAL: As noted in the History of Present Illness.   GENITOURINARY: No problems with urination. Denies any hematuria or dysuria.  NEUROLOGIC: No dizziness or vertigo, denies headaches.   MUSCULOSKELETAL: Denies any muscle or joint pain.   SKIN: Denies skin rashes or itching.   ENDOCRINE: Denies excessive thirst. Denies intolerance to heat or cold.  PSYCHOSOCIAL: Denies depression or anxiety. Denies any recent memory loss.       Historical Information   Past Medical History:   Diagnosis Date    Ear problems     Fallen arches 05/01/2021    GERD (gastroesophageal reflux disease)     Hyperlipidemia     Hypertension      Past Surgical History:   Procedure Laterality Date    COLONOSCOPY      TONSILLECTOMY       Social History   Social History     Substance and Sexual Activity   Alcohol Use Not Currently    Alcohol/week: 0.0 standard drinks of alcohol     Social History     Substance and Sexual Activity   Drug Use Never     Social History     Tobacco Use   Smoking Status Former    Types: Cigars   Smokeless Tobacco Never     History reviewed. No pertinent family history.    Meds/Allergies       Current Outpatient Medications:     atorvastatin (LIPITOR) 20 mg tablet    b complex vitamins capsule    metoprolol succinate (TOPROL-XL) 6.25 mg 24 hr tablet    pantoprazole (PROTONIX) 40 mg tablet    vitamin E 100 UNIT capsule    No Known Allergies        Objective     Blood pressure 126/75, pulse 97,  "height 5' 10\" (1.778 m), weight 87.5 kg (193 lb). Body mass index is 27.69 kg/m².        PHYSICAL EXAM:      General Appearance:   Alert, cooperative, no distress   HEENT:   Normocephalic, atraumatic, anicteric.     Neck:  Supple, symmetrical, trachea midline   Lungs:   Clear to auscultation bilaterally; no rales, rhonchi or wheezing; respirations unlabored    Heart::   Regular rate and rhythm; no murmur, rub, or gallop.   Abdomen:   Soft, non-tender, non-distended; normal bowel sounds; no masses, no organomegaly    Genitalia:   Deferred    Rectal:   Deferred    Extremities:  No cyanosis, clubbing or edema    Pulses:  2+ and symmetric    Skin:  No jaundice, rashes, or lesions    Lymph nodes:  No palpable cervical lymphadenopathy        Lab Results:   No visits with results within 1 Day(s) from this visit.   Latest known visit with results is:   Hospital Outpatient Visit on 11/16/2023   Component Date Value    Case Report 11/16/2023                      Value:Surgical Pathology Report                         Case: [id number]                                   Authorizing Provider:  Palmer Avila MD         Collected:           11/16/2023 1045              Ordering Location:     [facility] Received:            11/17/2023 0815                                     [place]                                                                  Pathologist:           J Carlos Trevino MD                                                            Specimen:    Stomach, cold bx - antrum - H Py                                                           Final Diagnosis 11/16/2023                      Value:This result contains rich text formatting which cannot be displayed here.    Note 11/16/2023                      Value:This result contains rich text formatting which cannot be displayed here.    Additional Information 11/16/2023                      Value:This result contains rich text formatting which cannot be " displayed here.    Gross Description 11/16/2023                      Value:This result contains rich text formatting which cannot be displayed here.    Clinical Information 11/16/2023                      Value:GERD           Radiology Results:   No results found.

## 2024-02-21 PROBLEM — Z12.11 COLON CANCER SCREENING: Status: RESOLVED | Noted: 2023-11-02 | Resolved: 2024-02-21

## 2024-04-14 DIAGNOSIS — K21.9 GASTROESOPHAGEAL REFLUX DISEASE, UNSPECIFIED WHETHER ESOPHAGITIS PRESENT: ICD-10-CM

## 2024-04-15 RX ORDER — PANTOPRAZOLE SODIUM 40 MG/1
TABLET, DELAYED RELEASE ORAL
Qty: 90 TABLET | Refills: 1 | Status: SHIPPED | OUTPATIENT
Start: 2024-04-15

## 2024-07-19 ENCOUNTER — APPOINTMENT (OUTPATIENT)
Dept: LAB | Facility: CLINIC | Age: 79
End: 2024-07-19

## 2024-07-19 DIAGNOSIS — Z00.6 ENCOUNTER FOR EXAMINATION FOR NORMAL COMPARISON OR CONTROL IN CLINICAL RESEARCH PROGRAM: ICD-10-CM

## 2024-07-19 PROCEDURE — 36415 COLL VENOUS BLD VENIPUNCTURE: CPT

## 2024-08-06 LAB
APOB+LDLR+PCSK9 GENE MUT ANL BLD/T: NOT DETECTED
BRCA1+BRCA2 DEL+DUP + FULL MUT ANL BLD/T: NOT DETECTED
MLH1+MSH2+MSH6+PMS2 GN DEL+DUP+FUL M: NOT DETECTED

## 2024-08-26 ENCOUNTER — HOSPITAL ENCOUNTER (OUTPATIENT)
Dept: CT IMAGING | Facility: HOSPITAL | Age: 79
Discharge: HOME | End: 2024-08-26
Attending: INTERNAL MEDICINE
Payer: COMMERCIAL

## 2024-08-26 DIAGNOSIS — I77.810 THORACIC AORTIC ECTASIA (HCC): ICD-10-CM

## 2024-08-26 PROCEDURE — 71250 CT THORAX DX C-: CPT

## 2024-10-03 DIAGNOSIS — K21.9 GASTROESOPHAGEAL REFLUX DISEASE, UNSPECIFIED WHETHER ESOPHAGITIS PRESENT: ICD-10-CM

## 2024-10-03 RX ORDER — PANTOPRAZOLE SODIUM 40 MG/1
TABLET, DELAYED RELEASE ORAL
Qty: 90 TABLET | Refills: 0 | Status: SHIPPED | OUTPATIENT
Start: 2024-10-03

## 2024-10-08 NOTE — TELEPHONE ENCOUNTER
Pt was called to schedule a fu ov for a medication check, left a message on pts vm to return the call.

## 2025-02-05 ENCOUNTER — TELEPHONE (OUTPATIENT)
Dept: GASTROENTEROLOGY | Facility: CLINIC | Age: 80
End: 2025-02-05

## 2025-02-05 NOTE — TELEPHONE ENCOUNTER
I spoke to pt who states he is doing well and doesn't feel the need for an ov. He is aware he would need an ov to get a refill of his meds and will call back if he needs us. Sb